# Patient Record
(demographics unavailable — no encounter records)

---

## 2024-10-07 NOTE — DISCUSSION/SUMMARY
[de-identified] : 32yo male with a right nondisplaced distal fibula fracture. He may WBAT in boot. Boot may be removed for hygiene and at rest. Ice, compression and NSAIDs PRN for pain. Patient to follow up in 2-3 weeks for repeat X-Rays.   The patient was given the opportunity to ask questions, and all questions were answered to their satisfaction.

## 2024-10-07 NOTE — HISTORY OF PRESENT ILLNESS
[de-identified] : Patient presents for evaluation today s/p right ankle injury. He was playing football on 10/3/24 and another player fell directly on his ankle, causing him to twist it. He went to Urgent Care after, was diagnosed with a fracture and placed in a boot. He is here for orthopedic evaluation today.

## 2024-10-07 NOTE — PHYSICAL EXAM
[de-identified] : General Exam: Appearance: well developed and nourished Orientation: Alert and oriented to person, place, time. Mood: mood and affect well-adjusted, pleasant and cooperative, appropriate for clinical and encounter circumstances  Right Ankle: Skin: +ecchymosis and swelling; intact, no rashes or lesions. Inspection: +TTP to fx site at distal fibula; normal alignment, no deformity, no tenderness, no warmth, no masses   Dorsiflexion: Strength: 5/5, normal muscle tone. Plantarflexion: Strength: 5/5, normal muscle tone. Inversion/Eversion: Strength: 5/5, normal muscle tone.  [de-identified] : grossly intact [de-identified] : 3 views of the right ankle obtained today show a nondisplaced right distal fibula fracture

## 2024-10-21 NOTE — REASON FOR VISIT
[Follow-Up Visit] : a follow-up visit for [Parent] : parent [FreeTextEntry2] : right ankle injury. DOI: 10/03/2024

## 2024-10-21 NOTE — PHYSICAL EXAM
[de-identified] : General Exam: Appearance: well developed and nourished Orientation: Alert and oriented to person, place, time. Mood: mood and affect well-adjusted, pleasant and cooperative, appropriate for clinical and encounter circumstances  Right Ankle: Skin: mild swelling; intact, no rashes or lesions. Inspection: +TTP to fx site at distal fibula; normal alignment, no deformity, no tenderness, no warmth, no masses Dorsiflexion: Strength: 5/5, normal muscle tone. Plantarflexion: Strength: 5/5, normal muscle tone. Inversion/Eversion: Strength: 5/5, normal muscle tone.   [de-identified] : full but stiff ROM [de-identified] : 3 views of the right ankle obtained today show a stable right distal fibula fracture, no change in alignment c/t previous imaging

## 2024-10-21 NOTE — HISTORY OF PRESENT ILLNESS
[de-identified] : Patient follows up s/p right ankle fracture today. He reports some improvement since last visit. Continues to wear CAM boot and use crutches to aid with ambulation.

## 2024-10-21 NOTE — DISCUSSION/SUMMARY
[de-identified] : 32yo male with a right distal fibula fracture. He will continue in CAM boot for next 2 weeks then wean out of boot into regular shoes. PT orders given today. Patient to continue ice, compression and pain control PRN. Follow up in 4 weeks.  The patient was given the opportunity to ask questions, and all questions were answered to their satisfaction.